# Patient Record
(demographics unavailable — no encounter records)

---

## 2025-06-23 NOTE — PLAN
[FreeTextEntry1] : 64 YO FEMALE PRESENTS FOR ANNUAL GYN EXAMINATION. DISCUSSED HORMONE REPLACEMENT THERAPY, RISKS, BENEFITS AND ALTERNATIVES. MAMMOGRAM ORDERED AND SELF BREAST EXAMINATION TAUGHT AND RECOMMENDED. BONE DENSITY STUDY INDICATIONS REVIEWED AND DISCUSSED.

## 2025-06-23 NOTE — PHYSICAL EXAM
[Appropriately responsive] : appropriately responsive [Alert] : alert [No Acute Distress] : no acute distress [Soft] : soft [Non-tender] : non-tender [Non-distended] : non-distended [No HSM] : No HSM [No Lesions] : no lesions [No Mass] : no mass [Oriented x3] : oriented x3 [Examination Of The Breasts] : a normal appearance [No Masses] : no breast masses were palpable [Labia Majora] : normal [Labia Minora] : normal [Absent] : absent [Uterine Adnexae] : normal [Normal rectal exam] : was normal [Normal Brown Stool] : was normal and brown [Occult Blood Positive] : was negative for occult blood analysis [Internal Hemorrhoid] : no internal hemorrhoids were present [External Hemorrhoid] : no external hemorrhoids were present [Skin Tags] : no residual hemorrhoidal skin tags [Normal] : was normal [None] : there was no rectal mass

## 2025-07-03 NOTE — ASSESSMENT
[FreeTextEntry1] : 64 yo female with history of irritable bowel syndrome constipation.  Patient advised to use daily MiraLAX and increase water intake.  Patient to call in one month to determine response, or sooner with any concerns.

## 2025-07-03 NOTE — HISTORY OF PRESENT ILLNESS
[FreeTextEntry1] : Ms. SLADE STEEL is a 65 year old female with history of constipation and bloating.  Patient has a long history of irritable bowel syndrome.  Recently, patient is in exacerbation with increasing constipation and bloating.  She also noted some dyspepsia as well.  Heartburn symptoms been under good control with pantoprazole.  There is no nausea vomiting or any new medications.  Patient had episode of frequent bowel movements when now is back to baseline.  Patient had colonoscopy and endoscopy done 2023.